# Patient Record
Sex: FEMALE | Race: WHITE | HISPANIC OR LATINO | Employment: OTHER | URBAN - METROPOLITAN AREA
[De-identification: names, ages, dates, MRNs, and addresses within clinical notes are randomized per-mention and may not be internally consistent; named-entity substitution may affect disease eponyms.]

---

## 2024-05-09 NOTE — PROGRESS NOTES
NEW PATIENT    HISTORY OF PRESENT ILLNESS   69 y.o. Female with a history of right ankle. She states that she has had bilateral achilles pain for many years. The right achilles is worse. She has had PRP injections in the right achilles with no relief.  She states she injured it about 2 months ago and was diagnosed with an Achilles injury in [place].  She presents for 2nd opinion.    She is also complaining of left knee pain for about a year. Her knee pain is worse with bending, climbing, and squatting. She reports having a laser therapy procedure on the left knee with no relief.  She states they offered her some type of procedure to clean up the fat pad.    Is affecting ADLs. Pain is 2/10 at it's worst.        PAST MEDICAL HISTORY    Past Medical History:   Diagnosis Date    Thyroid disease        PAST SURGICAL HISTORY     No past surgical history on file.    FAMILY HISTORY    No family history on file.    SOCIAL HISTORY    Social History     Socioeconomic History    Marital status:    Tobacco Use    Smoking status: Never   Substance and Sexual Activity    Alcohol use: Yes     Alcohol/week: 0.0 standard drinks of alcohol     Comment: Socially.   Social History Narrative    ** Merged History Encounter **            MEDICATIONS      Current Outpatient Medications:     levothyroxine (SYNTHROID) 75 MCG tablet, Take 1 tablet (75 mcg total) by mouth before breakfast., Disp: 90 tablet, Rfl: 3    pregabalin (LYRICA) 75 MG capsule, Take 75 mg by mouth once daily., Disp: , Rfl:     betamethasone valerate 0.1% (VALISONE) 0.1 % Crea, Apply topically 2 (two) times daily., Disp: 45 g, Rfl: 1    conjugated estrogens (PREMARIN) vaginal cream, Place 0.5 g vaginally once daily., Disp: 1 applicator, Rfl: 5    esomeprazole (NEXIUM) 40 MG capsule, Take 1 capsule (40 mg total) by mouth before breakfast., Disp: 90 capsule, Rfl: 0    ALLERGIES     Review of patient's allergies indicates:  No Known Allergies      REVIEW OF SYSTEMS  "  Constitution: Negative. Negative for chills, fever and night sweats.   HENT: Negative for congestion and headaches.    Eyes: Negative for blurred vision, left vision loss and right vision loss.   Cardiovascular: Negative for chest pain and syncope.   Respiratory: Negative for cough and shortness of breath.    Endocrine: Negative for polydipsia, polyphagia and polyuria.   Hematologic/Lymphatic: Negative for bleeding problem. Does not bruise/bleed easily.   Skin: Negative for dry skin, itching and rash.   Musculoskeletal: Negative for falls. Positive for right ankle and left knee pain.  Gastrointestinal: Negative for abdominal pain and bowel incontinence.   Genitourinary: Negative for bladder incontinence and nocturia.   Neurological: Negative for disturbances in coordination, loss of balance and seizures.   Psychiatric/Behavioral: Negative for depression. The patient does not have insomnia.    Allergic/Immunologic: Negative for hives and persistent infections.     PHYSICAL EXAMINATION    Vitals: /74   Pulse 65   Ht 5' 3" (1.6 m)   Wt 75.8 kg (167 lb)   BMI 29.58 kg/m²     General: The patient appears active and healthy with no apparent physical problems.  No disturbance of mood or affect is demonstrated. Alert and Oriented.    HEENT: Eyes normal, pupils equally round, nose normal.    Resp: Equal and symmetrical chest rises. No wheezing    CV: Regular rate    Neck: Supple; nonpainful range of motion.    Extremities: no cyanosis, clubbing, edema, or diffuse swelling.  Palpable pulses, good capillary refill of the digits.  No coolness, discoloration, edema or obvious varicosities.    Skin: no lesions noted.    Lymphatic: no detected adenopathy in the upper or lower extremities.    Neurologic: normal mental status, normal reflexes, normal gait and balance.  Patient is alert and oriented to person, place and time.  No flaccidity or spasticity is noted.  No motor or sensory deficits are noted.  Light touch is " intact    Orthopaedic: Ankle Exam-RIGHT    Inspection: Normal skin color and appearance with no scars, no ecchymosis and no swelling.  Longitudinal arch is normal.     ROM:  Limited range of motion of the right ankle.  Pain with dorsiflexion.  Mild pain with plantar flexion.      Palpation: There is no tenderness over the navicular, deltoid ligament, medial malleolus, anterior ankle joint, tibiofibular syndesmosis, lateral malleolus, ATFL, CFL, posterior talofibular ligament, sinus tarsi, calcaneus, plantar fascia origin, cuboid, fifth metatarsal, posterior tibialis tendon or peroneal tendons.  + Grady test, Tenderness along achilles tendon    Stability: - Anterior drawer - Talar tilt  Varus and valgus stress reveals no gapping or instability.     Strength: Muscle testing is 5/5 dorsiflexion, 4/5 plantar flexion, 5/5 inversion and 5/5 eversion.      Neuro: Sensation is normal in L4, L5 and S1 nerve distribution.  Reflexes are 2/2 ankle and 2/2 knee jerk.     Vascular: 2+ Pedal pulses are palpable.  Negative Homans.    KNEE EXAM - LEFT    Inspection:   Normal skin color and appearance with no scars, no ecchymosis and no effusion.      ROM:   0° - 110°.      Palpation:   There is no tenderness along medial joint line, medial plica, medial collateral ligament, pes bursa, lateral joint line, iliotibial band, lateral collateral ligament, popliteal fossa, patellar tendon, or quadriceps tendon.    Stability: - anterior drawer, - Lachman, - pivot shift and - posterior drawer.      No instability with varus or valgus stress at 0° or 30°. Negative dial  test at 30° and 90°.    Tests:   - Nigels test.  - patellar compression - grind test. There is no patellar apprehension. + patellofemoral crepitus     - J Sign. - Agustina's. - patellar tilt. - Quentin. Lateral patella translation  2 quadrants. Medial patella translation 2 quadrants    Motor:   Quadriceps strength is 5/5 and hamstrings strength is 5/5. Hamstrings show no  tightness.      Neuro:   Distal neurovascular status is normal    Vascular: Negative Homans and no palpable popliteal cords. 2+ pedal pulse with brisk cap refill    Gait slightly antalgic      IMAGING    X-Ray Ankle Complete Right  Narrative: EXAMINATION:  XR ANKLE COMPLETE 3 VIEW RIGHT    CLINICAL HISTORY:  Pain in right ankle and joints of right foot    TECHNIQUE:  AP, lateral, and oblique images of the right ankle were performed.    COMPARISON:  None    FINDINGS:  There is no evidence of fracture or osseous destruction.  There are surgical changes at the great toe incompletely imaged.  There is a 12 mm lobulated ossific density within the soft tissues posterior to the ankle.  Impression: As above    Electronically signed by: Yanci Sanchez MD  Date:    05/13/2024  Time:    10:58  X-ray Knee Ortho Left with Flexion  Narrative: EXAMINATION:  XR KNEE ORTHO LEFT WITH FLEXION    CLINICAL HISTORY:  . Pain in left knee    TECHNIQUE:  AP standing view of both knees, PA flexion standing views of both knees, and Merchant views of both knees were performed. A lateral view of the left knee was also performed.    COMPARISON:  None    FINDINGS:  The right knee demonstrates no fracture or osseous destructive process.  There is tricompartmental osteophyte formation.  Narrowing of the patellofemoral joint and to a lesser extent medial and lateral compartments of the knee is noted.    The left knee demonstrates no fracture or osseous destructive process.  There is tricompartmental osteophyte formation.  Narrowing of the medial and lateral compartments of the knee and to a lesser extent the patellofemoral joint.  No significant joint effusion.  Impression: As above    Electronically signed by: Yanci Sanchez MD  Date:    05/13/2024  Time:    10:53        IMPRESSION       ICD-10-CM ICD-9-CM   1. Achilles tendon rupture, right, initial encounter  S86.011A 845.09   2. Osteoarthritis of left patellofemoral joint  M17.12 715.36   3.  Right ankle pain, unspecified chronicity  M25.571 719.47   4. Chronic pain of left knee  M25.562 719.46    G89.29 338.29       MEDICATIONS PRESCRIBED      None    RECOMMENDATIONS     CSI of left knee performed today  MRI of right ankle ordered  RTC after MRI     Large Joint Aspiration/Injection: L knee    Date/Time: 5/13/2024 9:30 AM    Performed by: Tam Kan MD  Authorized by: Tam Kan MD    Consent Done?:  Yes (Verbal)  Indications:  Pain  Site marked: the procedure site was marked    Timeout: prior to procedure the correct patient, procedure, and site was verified    Prep: patient was prepped and draped in usual sterile fashion      Local anesthesia used?: Yes    Local anesthetic:  Co-phenylcaine spray (0.2% Naropin)  Anesthetic total (ml):  4      Details:  Needle Size:  22 G  Ultrasonic Guidance for needle placement?: No    Approach:  Anterolateral  Location:  Knee  Site:  L knee  Medications:  40 mg triamcinolone acetonide 40 mg/mL  Medications comment:  5 mL LIDOcaine HCL 10 mg/ml (1%) 10 mg/mL (1 %)  Patient tolerance:  Patient tolerated the procedure well with no immediate complications    All questions were answered, pt will contact us for questions or concerns in the interim.

## 2024-05-13 ENCOUNTER — HOSPITAL ENCOUNTER (OUTPATIENT)
Dept: RADIOLOGY | Facility: HOSPITAL | Age: 69
Discharge: HOME | End: 2024-05-13
Attending: ORTHOPAEDIC SURGERY

## 2024-05-13 ENCOUNTER — OFFICE VISIT (OUTPATIENT)
Dept: SPORTS MEDICINE | Facility: CLINIC | Age: 69
End: 2024-05-13

## 2024-05-13 VITALS
HEIGHT: 63 IN | SYSTOLIC BLOOD PRESSURE: 135 MMHG | DIASTOLIC BLOOD PRESSURE: 74 MMHG | BODY MASS INDEX: 29.59 KG/M2 | HEART RATE: 65 BPM | WEIGHT: 167 LBS

## 2024-05-13 DIAGNOSIS — S86.011A ACHILLES TENDON RUPTURE, RIGHT, INITIAL ENCOUNTER: Primary | ICD-10-CM

## 2024-05-13 DIAGNOSIS — M25.571 RIGHT ANKLE PAIN, UNSPECIFIED CHRONICITY: ICD-10-CM

## 2024-05-13 DIAGNOSIS — M25.562 CHRONIC PAIN OF LEFT KNEE: ICD-10-CM

## 2024-05-13 DIAGNOSIS — M17.12 OSTEOARTHRITIS OF LEFT PATELLOFEMORAL JOINT: ICD-10-CM

## 2024-05-13 DIAGNOSIS — G89.29 CHRONIC PAIN OF LEFT KNEE: ICD-10-CM

## 2024-05-13 PROCEDURE — 99213 OFFICE O/P EST LOW 20 MIN: CPT | Mod: PBBFAC,25 | Performed by: ORTHOPAEDIC SURGERY

## 2024-05-13 PROCEDURE — 73610 X-RAY EXAM OF ANKLE: CPT | Mod: 26,RT,, | Performed by: INTERNAL MEDICINE

## 2024-05-13 PROCEDURE — 20610 DRAIN/INJ JOINT/BURSA W/O US: CPT | Mod: PBBFAC,LT | Performed by: ORTHOPAEDIC SURGERY

## 2024-05-13 PROCEDURE — 99205 OFFICE O/P NEW HI 60 MIN: CPT | Mod: 25,S$PBB,, | Performed by: ORTHOPAEDIC SURGERY

## 2024-05-13 PROCEDURE — 73564 X-RAY EXAM KNEE 4 OR MORE: CPT | Mod: 26,LT,, | Performed by: INTERNAL MEDICINE

## 2024-05-13 PROCEDURE — 73562 X-RAY EXAM OF KNEE 3: CPT | Mod: 26,59,RT, | Performed by: INTERNAL MEDICINE

## 2024-05-13 PROCEDURE — 99999 PR PBB SHADOW E&M-EST. PATIENT-LVL III: CPT | Mod: PBBFAC,,, | Performed by: ORTHOPAEDIC SURGERY

## 2024-05-13 PROCEDURE — 99999PBSHW PR PBB SHADOW TECHNICAL ONLY FILED TO HB: Mod: PBBFAC,,,

## 2024-05-13 PROCEDURE — 73564 X-RAY EXAM KNEE 4 OR MORE: CPT | Mod: TC,LT

## 2024-05-13 PROCEDURE — 73610 X-RAY EXAM OF ANKLE: CPT | Mod: TC,RT

## 2024-05-13 RX ORDER — TRIAMCINOLONE ACETONIDE 40 MG/ML
40 INJECTION, SUSPENSION INTRA-ARTICULAR; INTRAMUSCULAR
Status: DISCONTINUED | OUTPATIENT
Start: 2024-05-13 | End: 2024-05-13 | Stop reason: HOSPADM

## 2024-05-13 RX ADMIN — TRIAMCINOLONE ACETONIDE 40 MG: 40 INJECTION, SUSPENSION INTRA-ARTICULAR; INTRAMUSCULAR at 09:05

## 2024-05-14 DIAGNOSIS — M51.36 DDD (DEGENERATIVE DISC DISEASE), LUMBAR: Primary | ICD-10-CM

## 2024-05-14 DIAGNOSIS — E03.9 ACQUIRED HYPOTHYROIDISM: Primary | ICD-10-CM

## 2024-05-14 NOTE — PROGRESS NOTES
"ESTABLISHED PATIENT     History 5/15/2024:  Bere returns to clinic today to discuss MRI results of her right ankle.    History 5/13/2024:  69 y.o. Female with a history of right ankle. She states that she has had bilateral achilles pain for many years. The right achilles is worse. She has had PRP injections in the right achilles with no relief.  She states she injured it about 2 months ago and was diagnosed with an Achilles injury in [place].  She presents for 2nd opinion.    She is also complaining of left knee pain for about a year. Her knee pain is worse with bending, climbing, and squatting. She reports having a laser therapy procedure on the left knee with no relief.  She states they offered her some type of procedure to clean up the fat pad.    Is affecting ADLs. Pain is 2/10 at it's worst.      REVIEW OF SYSTEMS   Constitution: Negative. Negative for chills, fever and night sweats.   HENT: Negative for congestion and headaches.    Eyes: Negative for blurred vision, left vision loss and right vision loss.   Cardiovascular: Negative for chest pain and syncope.   Respiratory: Negative for cough and shortness of breath.    Endocrine: Negative for polydipsia, polyphagia and polyuria.   Hematologic/Lymphatic: Negative for bleeding problem. Does not bruise/bleed easily.   Skin: Negative for dry skin, itching and rash.   Musculoskeletal: Negative for falls. Positive for right ankle and left knee pain.  Gastrointestinal: Negative for abdominal pain and bowel incontinence.   Genitourinary: Negative for bladder incontinence and nocturia.   Neurological: Negative for disturbances in coordination, loss of balance and seizures.   Psychiatric/Behavioral: Negative for depression. The patient does not have insomnia.    Allergic/Immunologic: Negative for hives and persistent infections.     PHYSICAL EXAMINATION    Vitals: /74   Pulse 65   Ht 5' 3" (1.6 m)   Wt 75.8 kg (167 lb)   BMI 29.58 kg/m²     General: The " patient appears active and healthy with no apparent physical problems.  No disturbance of mood or affect is demonstrated. Alert and Oriented.    HEENT: Eyes normal, pupils equally round, nose normal.    Resp: Equal and symmetrical chest rises. No wheezing    CV: Regular rate    Neck: Supple; nonpainful range of motion.    Extremities: no cyanosis, clubbing, edema, or diffuse swelling.  Palpable pulses, good capillary refill of the digits.  No coolness, discoloration, edema or obvious varicosities.    Skin: no lesions noted.    Lymphatic: no detected adenopathy in the upper or lower extremities.    Neurologic: normal mental status, normal reflexes, normal gait and balance.  Patient is alert and oriented to person, place and time.  No flaccidity or spasticity is noted.  No motor or sensory deficits are noted.  Light touch is intact    Orthopaedic: Ankle Exam-RIGHT    Inspection: Normal skin color and appearance with no scars, no ecchymosis and no swelling.  Longitudinal arch is normal.     ROM:  Limited range of motion of the right ankle.  Pain with dorsiflexion.  Mild pain with plantar flexion.      Palpation: There is no tenderness over the navicular, deltoid ligament, medial malleolus, anterior ankle joint, tibiofibular syndesmosis, lateral malleolus, ATFL, CFL, posterior talofibular ligament, sinus tarsi, calcaneus, plantar fascia origin, cuboid, fifth metatarsal, posterior tibialis tendon or peroneal tendons.  + Grady test, Tenderness along achilles tendon    Stability: - Anterior drawer - Talar tilt  Varus and valgus stress reveals no gapping or instability.     Strength: Muscle testing is 5/5 dorsiflexion, 4/5 plantar flexion, 5/5 inversion and 5/5 eversion.      Neuro: Sensation is normal in L4, L5 and S1 nerve distribution.  Reflexes are 2/2 ankle and 2/2 knee jerk.     Vascular: 2+ Pedal pulses are palpable.  Negative Homans.    IMAGING    X-Ray Ankle Complete Right  Narrative: EXAMINATION:  XR ANKLE  COMPLETE 3 VIEW RIGHT    CLINICAL HISTORY:  Pain in right ankle and joints of right foot    TECHNIQUE:  AP, lateral, and oblique images of the right ankle were performed.    COMPARISON:  None    FINDINGS:  There is no evidence of fracture or osseous destruction.  There are surgical changes at the great toe incompletely imaged.  There is a 12 mm lobulated ossific density within the soft tissues posterior to the ankle.  Impression: As above    Electronically signed by: Yanci Sanchez MD  Date:    05/13/2024  Time:    10:58    Narrative & Impression  EXAMINATION:  MRI ANKLE WITHOUT CONTRAST RIGHT     CLINICAL HISTORY:  Ankle trauma, dislocation/ligament injury suspected (Age >= 5y);  Strain of right Achilles tendon, initial encounter     TECHNIQUE:  Multiplanar multisequence MRI examination of the right ankle.     COMPARISON:  Renal radiograph 05/13/2024.     FINDINGS:  LIGAMENTS: Anterior talofibular, posterior talofibular, superficial deltoid, deep deltoid, superomedial spring, anterior-inferior tibiofibular, posterior-inferior tibiofibular, dorsal talonavicular, bifurcate and dorsal calcaneocuboid ligaments appear grossly intact.     TENDONS: Complete avulsion of the insertional fibers of the Achilles tendon with 4.5 cm of retraction.  Retracted tendon stump demonstrates tendinosis with partial-thickness interstitial tearing and a 1.5 cm avulsed enthesophyte.  Peroneus brevis tendinosis with partial-thickness tearing most pronounced just distal to the retromalleolar groove.  Peroneus longus tendinosis with partial-thickness tearing extending from the retromalleolar groove distally up to the level of the cuboid sulcus.  Trace posterior tibial tendon sheath fluid.  Flexor digitorum longus, flexor hallucis longus and extensor tendons are intact.     BONES: No acute fractures.  No avascular necrosis.  No marrow infiltrative process.  Mild subcortical marrow edema at the dorsal aspect of the talar neck, likely  degenerative.  Slightly prominent peroneal tubercle.     JOINTS/CARTILAGE: 0.3 x 0.4 cm focal area of chondral fissuring with subchondral cystic change at the medial talar dome.  Subtalar, midtarsal, naviculocuneiform and tarsometatarsal chondral surfaces are grossly preserved.  No significant joint effusion.  No tarsal coalition.  No subluxation or dislocation.     MUSCLES: Normal bulk and signal intensity.  No accessory muscles.     MISCELLANEOUS: Thickening and increased signal intensity of the central cord of the plantar aponeurosis with a small calcaneal enthesophyte.  Sinus tarsi demonstrates preserved signal intensity.  Tarsal tunnel appears normal.  Mild nonspecific subcutaneous edema.     Impression:     1. Complete avulsion of the insertional Achilles tendon with 4.5 cm of retraction.  Tendinosis and partial-thickness interstitial tearing of the retracted tendon stump with a 1.5 cm avulsed enthesophyte.  2. Peroneus longus and peroneus brevis tendinosis and partial-thickness tearing.  3. 0.3 x 0.4 cm osteochondral lesion of the medial talar dome.  4. Findings suggestive of mild chronic plantar fascitis involving the central cord.     Electronically signed by resident: Beena Ochoa  Date:                                            05/15/2024  Time:                                           11:36     Electronically signed by:González Mascorro MD  Date:                                            05/15/2024  Time:                                           15:09    IMPRESSION       ICD-10-CM ICD-9-CM   1. Achilles tendon rupture, right, initial encounter  S86.011A 845.09         MEDICATIONS PRESCRIBED      None    RECOMMENDATIONS     Referral to Coy Clement MD for second opinion.  We discussed nonsurgical and surgical management of her chronic insertional Achilles tendon rupture.  Considering it has been nearly 6 months in addition to her low demand lifestyle, I did recommend potential nonoperative management  as an option.  She does have some concern related to some hammertoes that she wants to get an opinion on.  I recommended she obtain a 2nd opinion for both her Achilles and the hammertoes.  I advised her that the Achilles could require not only a V-Y lengthening but may require an [facility] transfer.  I will see her back as needed.  I will also be happy to do the surgery in combination with Dr. Johnson if needed.      All questions were answered, pt will contact us for questions or concerns in the interim.

## 2024-05-15 ENCOUNTER — OFFICE VISIT (OUTPATIENT)
Dept: SPORTS MEDICINE | Facility: CLINIC | Age: 69
End: 2024-05-15

## 2024-05-15 ENCOUNTER — HOSPITAL ENCOUNTER (OUTPATIENT)
Dept: RADIOLOGY | Facility: HOSPITAL | Age: 69
Discharge: HOME | End: 2024-05-15
Attending: STUDENT IN AN ORGANIZED HEALTH CARE EDUCATION/TRAINING PROGRAM

## 2024-05-15 ENCOUNTER — OFFICE VISIT (OUTPATIENT)
Dept: INTERNAL MEDICINE | Facility: CLINIC | Age: 69
End: 2024-05-15

## 2024-05-15 ENCOUNTER — HOSPITAL ENCOUNTER (OUTPATIENT)
Dept: CARDIOLOGY | Facility: CLINIC | Age: 69
Discharge: HOME | End: 2024-05-15

## 2024-05-15 ENCOUNTER — HOSPITAL ENCOUNTER (OUTPATIENT)
Dept: RADIOLOGY | Facility: HOSPITAL | Age: 69
Discharge: HOME | End: 2024-05-15
Attending: ORTHOPAEDIC SURGERY

## 2024-05-15 VITALS
WEIGHT: 167 LBS | BODY MASS INDEX: 29.59 KG/M2 | SYSTOLIC BLOOD PRESSURE: 126 MMHG | HEIGHT: 63 IN | HEART RATE: 65 BPM | DIASTOLIC BLOOD PRESSURE: 70 MMHG

## 2024-05-15 VITALS
BODY MASS INDEX: 29.54 KG/M2 | DIASTOLIC BLOOD PRESSURE: 69 MMHG | WEIGHT: 166.69 LBS | HEIGHT: 63 IN | HEART RATE: 65 BPM | SYSTOLIC BLOOD PRESSURE: 137 MMHG

## 2024-05-15 DIAGNOSIS — E03.9 ACQUIRED HYPOTHYROIDISM: ICD-10-CM

## 2024-05-15 DIAGNOSIS — S86.011A ACHILLES TENDON RUPTURE, RIGHT, INITIAL ENCOUNTER: Primary | ICD-10-CM

## 2024-05-15 DIAGNOSIS — I87.2 VENOUS INSUFFICIENCY: ICD-10-CM

## 2024-05-15 DIAGNOSIS — S86.009D: ICD-10-CM

## 2024-05-15 DIAGNOSIS — R32 URINARY INCONTINENCE, UNSPECIFIED TYPE: ICD-10-CM

## 2024-05-15 DIAGNOSIS — S86.001D INJURY OF RIGHT ACHILLES TENDON, SUBSEQUENT ENCOUNTER: ICD-10-CM

## 2024-05-15 DIAGNOSIS — S86.011A ACHILLES TENDON RUPTURE, RIGHT, INITIAL ENCOUNTER: ICD-10-CM

## 2024-05-15 DIAGNOSIS — Z00.00 HEALTHCARE MAINTENANCE: ICD-10-CM

## 2024-05-15 DIAGNOSIS — M51.36 DDD (DEGENERATIVE DISC DISEASE), LUMBAR: ICD-10-CM

## 2024-05-15 DIAGNOSIS — E03.9 ACQUIRED HYPOTHYROIDISM: Primary | ICD-10-CM

## 2024-05-15 DIAGNOSIS — Z00.00 ROUTINE GENERAL MEDICAL EXAMINATION AT HEALTH CARE FACILITY: Primary | ICD-10-CM

## 2024-05-15 DIAGNOSIS — D72.823 LEUKEMOID REACTION: ICD-10-CM

## 2024-05-15 LAB
OHS QRS DURATION: 88 MS
OHS QTC CALCULATION: 409 MS

## 2024-05-15 PROCEDURE — 99214 OFFICE O/P EST MOD 30 MIN: CPT | Mod: S$PBB,,, | Performed by: ORTHOPAEDIC SURGERY

## 2024-05-15 PROCEDURE — 71046 X-RAY EXAM CHEST 2 VIEWS: CPT | Mod: TC

## 2024-05-15 PROCEDURE — 99213 OFFICE O/P EST LOW 20 MIN: CPT | Mod: PBBFAC,25 | Performed by: ORTHOPAEDIC SURGERY

## 2024-05-15 PROCEDURE — 93005 ELECTROCARDIOGRAM TRACING: CPT | Mod: PBBFAC | Performed by: INTERNAL MEDICINE

## 2024-05-15 PROCEDURE — 99999 PR PBB SHADOW E&M-EST. PATIENT-LVL V: CPT | Mod: PBBFAC,,, | Performed by: STUDENT IN AN ORGANIZED HEALTH CARE EDUCATION/TRAINING PROGRAM

## 2024-05-15 PROCEDURE — 99215 OFFICE O/P EST HI 40 MIN: CPT | Mod: PBBFAC,25,27 | Performed by: STUDENT IN AN ORGANIZED HEALTH CARE EDUCATION/TRAINING PROGRAM

## 2024-05-15 PROCEDURE — 93010 ELECTROCARDIOGRAM REPORT: CPT | Mod: S$PBB,,, | Performed by: INTERNAL MEDICINE

## 2024-05-15 PROCEDURE — 73721 MRI JNT OF LWR EXTRE W/O DYE: CPT | Mod: 26,RT,, | Performed by: RADIOLOGY

## 2024-05-15 PROCEDURE — 73721 MRI JNT OF LWR EXTRE W/O DYE: CPT | Mod: TC,RT

## 2024-05-15 PROCEDURE — 99204 OFFICE O/P NEW MOD 45 MIN: CPT | Mod: S$PBB,,, | Performed by: STUDENT IN AN ORGANIZED HEALTH CARE EDUCATION/TRAINING PROGRAM

## 2024-05-15 PROCEDURE — 99999 PR PBB SHADOW E&M-EST. PATIENT-LVL III: CPT | Mod: PBBFAC,,, | Performed by: ORTHOPAEDIC SURGERY

## 2024-05-15 PROCEDURE — 71046 X-RAY EXAM CHEST 2 VIEWS: CPT | Mod: 26,,, | Performed by: RADIOLOGY

## 2024-05-15 RX ORDER — LEVOTHYROXINE SODIUM 75 UG/1
75 TABLET ORAL
COMMUNITY
End: 2024-05-15

## 2024-05-15 RX ORDER — ESOMEPRAZOLE MAGNESIUM 40 MG/1
40 CAPSULE, DELAYED RELEASE ORAL
COMMUNITY

## 2024-05-15 RX ORDER — LEVOTHYROXINE SODIUM 50 UG/1
50 TABLET ORAL
Qty: 90 TABLET | Refills: 3 | Status: SHIPPED | OUTPATIENT
Start: 2024-05-15 | End: 2025-05-15

## 2024-05-15 NOTE — PROGRESS NOTES
"ESTABLISHED PATIENT     History 5/15/2024:  Bere returns to clinic today to discuss MRI results of her right ankle.    History 5/13/2024:  69 y.o. Female with a history of right ankle. She states that she has had bilateral achilles pain for many years. The right achilles is worse. She has had PRP injections in the right achilles with no relief.  She states she injured it about 2 months ago and was diagnosed with an Achilles injury in [place].  She presents for 2nd opinion.    She is also complaining of left knee pain for about a year. Her knee pain is worse with bending, climbing, and squatting. She reports having a laser therapy procedure on the left knee with no relief.  She states they offered her some type of procedure to clean up the fat pad.    Is affecting ADLs. Pain is 2/10 at it's worst.      REVIEW OF SYSTEMS   Constitution: Negative. Negative for chills, fever and night sweats.   HENT: Negative for congestion and headaches.    Eyes: Negative for blurred vision, left vision loss and right vision loss.   Cardiovascular: Negative for chest pain and syncope.   Respiratory: Negative for cough and shortness of breath.    Endocrine: Negative for polydipsia, polyphagia and polyuria.   Hematologic/Lymphatic: Negative for bleeding problem. Does not bruise/bleed easily.   Skin: Negative for dry skin, itching and rash.   Musculoskeletal: Negative for falls. Positive for right ankle and left knee pain.  Gastrointestinal: Negative for abdominal pain and bowel incontinence.   Genitourinary: Negative for bladder incontinence and nocturia.   Neurological: Negative for disturbances in coordination, loss of balance and seizures.   Psychiatric/Behavioral: Negative for depression. The patient does not have insomnia.    Allergic/Immunologic: Negative for hives and persistent infections.     PHYSICAL EXAMINATION    Vitals: /74   Pulse 65   Ht 5' 3" (1.6 m)   Wt 75.8 kg (167 lb)   BMI 29.58 kg/m²     General: The " patient appears active and healthy with no apparent physical problems.  No disturbance of mood or affect is demonstrated. Alert and Oriented.    HEENT: Eyes normal, pupils equally round, nose normal.    Resp: Equal and symmetrical chest rises. No wheezing    CV: Regular rate    Neck: Supple; nonpainful range of motion.    Extremities: no cyanosis, clubbing, edema, or diffuse swelling.  Palpable pulses, good capillary refill of the digits.  No coolness, discoloration, edema or obvious varicosities.    Skin: no lesions noted.    Lymphatic: no detected adenopathy in the upper or lower extremities.    Neurologic: normal mental status, normal reflexes, normal gait and balance.  Patient is alert and oriented to person, place and time.  No flaccidity or spasticity is noted.  No motor or sensory deficits are noted.  Light touch is intact    Orthopaedic: Ankle Exam-RIGHT    Inspection: Normal skin color and appearance with no scars, no ecchymosis and no swelling.  Longitudinal arch is normal.     ROM:  Limited range of motion of the right ankle.  Pain with dorsiflexion.  Mild pain with plantar flexion.      Palpation: There is no tenderness over the navicular, deltoid ligament, medial malleolus, anterior ankle joint, tibiofibular syndesmosis, lateral malleolus, ATFL, CFL, posterior talofibular ligament, sinus tarsi, calcaneus, plantar fascia origin, cuboid, fifth metatarsal, posterior tibialis tendon or peroneal tendons.  + Grady test, Tenderness along achilles tendon    Stability: - Anterior drawer - Talar tilt  Varus and valgus stress reveals no gapping or instability.     Strength: Muscle testing is 5/5 dorsiflexion, 4/5 plantar flexion, 5/5 inversion and 5/5 eversion.      Neuro: Sensation is normal in L4, L5 and S1 nerve distribution.  Reflexes are 2/2 ankle and 2/2 knee jerk.     Vascular: 2+ Pedal pulses are palpable.  Negative Homans.    IMAGING    X-Ray Ankle Complete Right  Narrative: EXAMINATION:  XR ANKLE  COMPLETE 3 VIEW RIGHT    CLINICAL HISTORY:  Pain in right ankle and joints of right foot    TECHNIQUE:  AP, lateral, and oblique images of the right ankle were performed.    COMPARISON:  None    FINDINGS:  There is no evidence of fracture or osseous destruction.  There are surgical changes at the great toe incompletely imaged.  There is a 12 mm lobulated ossific density within the soft tissues posterior to the ankle.  Impression: As above    Electronically signed by: Yanci Sanchez MD  Date:    05/13/2024  Time:    10:58    Narrative & Impression  EXAMINATION:  MRI ANKLE WITHOUT CONTRAST RIGHT     CLINICAL HISTORY:  Ankle trauma, dislocation/ligament injury suspected (Age >= 5y);  Strain of right Achilles tendon, initial encounter     TECHNIQUE:  Multiplanar multisequence MRI examination of the right ankle.     COMPARISON:  Renal radiograph 05/13/2024.     FINDINGS:  LIGAMENTS: Anterior talofibular, posterior talofibular, superficial deltoid, deep deltoid, superomedial spring, anterior-inferior tibiofibular, posterior-inferior tibiofibular, dorsal talonavicular, bifurcate and dorsal calcaneocuboid ligaments appear grossly intact.     TENDONS: Complete avulsion of the insertional fibers of the Achilles tendon with 4.5 cm of retraction.  Retracted tendon stump demonstrates tendinosis with partial-thickness interstitial tearing and a 1.5 cm avulsed enthesophyte.  Peroneus brevis tendinosis with partial-thickness tearing most pronounced just distal to the retromalleolar groove.  Peroneus longus tendinosis with partial-thickness tearing extending from the retromalleolar groove distally up to the level of the cuboid sulcus.  Trace posterior tibial tendon sheath fluid.  Flexor digitorum longus, flexor hallucis longus and extensor tendons are intact.     BONES: No acute fractures.  No avascular necrosis.  No marrow infiltrative process.  Mild subcortical marrow edema at the dorsal aspect of the talar neck, likely  degenerative.  Slightly prominent peroneal tubercle.     JOINTS/CARTILAGE: 0.3 x 0.4 cm focal area of chondral fissuring with subchondral cystic change at the medial talar dome.  Subtalar, midtarsal, naviculocuneiform and tarsometatarsal chondral surfaces are grossly preserved.  No significant joint effusion.  No tarsal coalition.  No subluxation or dislocation.     MUSCLES: Normal bulk and signal intensity.  No accessory muscles.     MISCELLANEOUS: Thickening and increased signal intensity of the central cord of the plantar aponeurosis with a small calcaneal enthesophyte.  Sinus tarsi demonstrates preserved signal intensity.  Tarsal tunnel appears normal.  Mild nonspecific subcutaneous edema.     Impression:     1. Complete avulsion of the insertional Achilles tendon with 4.5 cm of retraction.  Tendinosis and partial-thickness interstitial tearing of the retracted tendon stump with a 1.5 cm avulsed enthesophyte.  2. Peroneus longus and peroneus brevis tendinosis and partial-thickness tearing.  3. 0.3 x 0.4 cm osteochondral lesion of the medial talar dome.  4. Findings suggestive of mild chronic plantar fascitis involving the central cord.     Electronically signed by resident: Beena Ochoa  Date:                                            05/15/2024  Time:                                           11:36     Electronically signed by:González Mascorro MD  Date:                                            05/15/2024  Time:                                           15:09    IMPRESSION       ICD-10-CM ICD-9-CM   1. Achilles tendon rupture, right, initial encounter  S86.011A 845.09         MEDICATIONS PRESCRIBED      None    RECOMMENDATIONS     Referral to Coy Clement MD for second opinion.  We discussed nonsurgical and surgical management of her chronic insertional Achilles tendon rupture.  Considering it has been nearly 6 months in addition to her low demand lifestyle, I did recommend potential nonoperative management  as an option.  She does have some concern related to some hammertoes that she wants to get an opinion on.  I recommended she obtain a 2nd opinion for both her Achilles and the hammertoes.  I advised her that the Achilles could require not only a V-Y lengthening but may require an [facility] transfer.  I will see her back as needed.  I will also be happy to do the surgery in combination with Dr. Johnson if needed.      All questions were answered, pt will contact us for questions or concerns in the interim.

## 2024-05-15 NOTE — ASSESSMENT & PLAN NOTE
Long history of lumbar back pain radiating to the left gluteal area (burning).  Not incapacitating, mild.    Observation for now

## 2024-05-15 NOTE — PROGRESS NOTES
Subjective:       Patient ID: Bere Rivera is a 69 y.o. female.    Chief Complaint: Annual Exam    HPI    Bere Rivera is a 69 y.o. female , Azeri speaking, with a history of:  Hypothyroidism  R Achilles tendon injury  OA left, patellofemoral Sx  R ankle pain  Chronic pain of left knee  Urinary incontinence  H/o cystocele      [International Patient]  Originally from [place]  Lives in: [facility] [id number]       Patient comes to the clinic a general medical health examination and to establish care.  This is the first time I see this patient.    Patient is currently asymptomatic and has no complaints.  She is interested in a preoperative evaluation given possible right ankle surgery.    She states she has a very healthy person and he only problems has been knee pain, right ankle pain and right foot pain.    She had left knee arthroscopy last year and she is was told that at some point she is going to need left and right knee replacements.      SheWas also being seen for right ankle, that apparently was not Achilles tendon lesion.  She has been seen by sports medicine for these reason.  She also reports that patient has been modifying the way she walks and this led to deformity of the toes of the right foot.  She had a bunion surgery several years ago.      She has been on thyroid medication (Synthroid 75 mcg) for several years.  She is currently asymptomatic, feels well, she does not have any changes in her temperature, hair loss, palpitations, constipation or other constitutional symptoms.    Patient denies weight loss, hematochezia, melena, recent respiratory infections, urinary tract infections.      Patient had a hysterectomy in 2003.  She was told she hadCystocele daily and at some point she was recommended repair with mesh.  She has not done the surgery yet.  She complains of urgency urinary incontinence and dripping.  She has managed with wearing diapers before  bedtime, because she says she can not make it to the bathroom on time and she treats and wets her pajamas.    Patient denies CV symptoms, CP, SOB, palpitations.  Patient denies constitutional symptoms, fever, changes in the urine or stool.  No other concerns.    Past Medical History:   Diagnosis Date    Achilles tendon injury     RIGHT    Cystocele, unspecified     Hallux valgus (acquired), right foot     Hypothyroidism (acquired) 1989    OA (osteoarthritis) of knee     Other seasonal allergic rhinitis     Unspecified urinary incontinence        Past Surgical History:   Procedure Laterality Date    ARTHROSCOPY OF KNEE Left 2023    BUNIONECTOMY Right 2017    HYSTERECTOMY  2003       No family history on file.    Allergies:   Review of patient's allergies indicates:  No Known Allergies      Current Outpatient Medications:     esomeprazole (NEXIUM) 40 MG capsule, Take 40 mg by mouth before breakfast. AS NEEDED - Every 3 months, Disp: , Rfl:     UNABLE TO FIND, medication name: XUMER -- PAIN & ANTI- INFLAMMATORY (Patient not taking: Reported on 5/15/2024), Disp: , Rfl:     SYNTHROID 50 mcg tablet, Take 1 tablet (50 mcg total) by mouth before breakfast., Disp: 90 tablet, Rfl: 3    Social history:  , lives in [place]    Exercise:   Sedentary    Diet:   Regular with no restrictions    Tobacco use: Denies    Tobacco Use: Unknown (5/15/2024)    Patient History     Smoking Tobacco Use: Never     Smokeless Tobacco Use: Unknown     Passive Exposure: Not on file        Alcohol use: Denies    Recreational drug use: Denies    Recent travel: [place]      Most recent laboratories reviewed:    Recent Labs   Lab 05/15/24  0801 05/15/24  1412   WBC 17.41 H 17.89 H   Hemoglobin 14.2 14.4   Hematocrit 41.9 42.3   MCV 91 92   Platelets 406 423       Recent Labs   Lab 05/15/24  0801   Glucose 83   Sodium 141   Potassium 3.6   BUN 12   Creatinine 0.8   Total Bilirubin 0.5   AST 15   ALT 15       Recent Labs   Lab 05/15/24  0801    Hemoglobin A1C 5.5       Recent Labs   Lab 05/15/24  0801   Cholesterol 202 H   Triglycerides 96   HDL 78 H   LDL Cholesterol 104.8   Non-HDL Cholesterol 124       Recent Labs   Lab 05/15/24  0801   TSH 0.296 L       Recent Labs   Lab 05/15/24  0801   Hepatitis C Ab Non-reactive       Most recent images reviewed if available:  MRI Ankle Without Contrast Right  Narrative: EXAMINATION:  MRI ANKLE WITHOUT CONTRAST RIGHT    CLINICAL HISTORY:  Ankle trauma, dislocation/ligament injury suspected (Age >= 5y);  Strain of right Achilles tendon, initial encounter    TECHNIQUE:  Multiplanar multisequence MRI examination of the right ankle.    COMPARISON:  Renal radiograph 05/13/2024.    FINDINGS:  LIGAMENTS: Anterior talofibular, posterior talofibular, superficial deltoid, deep deltoid, superomedial spring, anterior-inferior tibiofibular, posterior-inferior tibiofibular, dorsal talonavicular, bifurcate and dorsal calcaneocuboid ligaments appear grossly intact.    TENDONS: Complete avulsion of the insertional fibers of the Achilles tendon with 4.5 cm of retraction.  Retracted tendon stump demonstrates tendinosis with partial-thickness interstitial tearing and a 1.5 cm avulsed enthesophyte.  Peroneus brevis tendinosis with partial-thickness tearing most pronounced just distal to the retromalleolar groove.  Peroneus longus tendinosis with partial-thickness tearing extending from the retromalleolar groove distally up to the level of the cuboid sulcus.  Trace posterior tibial tendon sheath fluid.  Flexor digitorum longus, flexor hallucis longus and extensor tendons are intact.    BONES: No acute fractures.  No avascular necrosis.  No marrow infiltrative process.  Mild subcortical marrow edema at the dorsal aspect of the talar neck, likely degenerative.  Slightly prominent peroneal tubercle.    JOINTS/CARTILAGE: 0.3 x 0.4 cm focal area of chondral fissuring with subchondral cystic change at the medial talar dome.  Subtalar,  midtarsal, naviculocuneiform and tarsometatarsal chondral surfaces are grossly preserved.  No significant joint effusion.  No tarsal coalition.  No subluxation or dislocation.    MUSCLES: Normal bulk and signal intensity.  No accessory muscles.    MISCELLANEOUS: Thickening and increased signal intensity of the central cord of the plantar aponeurosis with a small calcaneal enthesophyte.  Sinus tarsi demonstrates preserved signal intensity.  Tarsal tunnel appears normal.  Mild nonspecific subcutaneous edema.  Impression: 1. Complete avulsion of the insertional Achilles tendon with 4.5 cm of retraction.  Tendinosis and partial-thickness interstitial tearing of the retracted tendon stump with a 1.5 cm avulsed enthesophyte.  2. Peroneus longus and peroneus brevis tendinosis and partial-thickness tearing.  3. 0.3 x 0.4 cm osteochondral lesion of the medial talar dome.  4. Findings suggestive of mild chronic plantar fascitis involving the central cord.    Electronically signed by resident: Beena Ochoa  Date:    05/15/2024  Time:    11:36    Electronically signed by: González Mascorro MD  Date:    05/15/2024  Time:    15:09  X-Ray Chest PA And Lateral  Narrative: EXAMINATION:  XR CHEST PA AND LATERAL    CLINICAL HISTORY:  Hypothyroidism, unspecified    TECHNIQUE:  PA and lateral views of the chest were performed.    COMPARISON:  09/09/2011    FINDINGS:  Cardiac size is normal.  Lungs are clear.  No infiltrate is seen.  Impression: Normal chest    Electronically signed by: Sumanth Clarke MD  Date:    05/15/2024  Time:    12:30        Latest ECG results:  Results for orders placed or performed during the hospital encounter of 05/15/24   SCHEDULED EKG 12-LEAD (to Muse)    Collection Time: 05/15/24 12:33 PM   Result Value Ref Range    QRS Duration 88 ms    [facility] QTC Calculation 409 ms    Narrative    Test Reason : E03.9,    Vent. Rate : 069 BPM     Atrial Rate : 069 BPM     P-R Int : 106 ms          QRS Dur : 088 ms      QT  "Int : 382 ms       P-R-T Axes : 055 029 051 degrees     QTc Int : 409 ms    Sinus rhythm with short OR  Otherwise normal ECG  When compared with ECG of 09-NOV-2009 10:06,  No significant change was found  Confirmed by Bar Mann MD ([id number] on 5/15/2024 1:40:32 PM    Referred By: TOÑA TURNER           Confirmed By:Bar Mann MD       Results for orders placed or performed during the hospital encounter of 05/15/24 (from the past 2160 hour(s))   MRI Ankle Without Contrast Right    Impression    1. Complete avulsion of the insertional Achilles tendon with 4.5 cm of retraction.  Tendinosis and partial-thickness interstitial tearing of the retracted tendon stump with a 1.5 cm avulsed enthesophyte.  2. Peroneus longus and peroneus brevis tendinosis and partial-thickness tearing.  3. 0.3 x 0.4 cm osteochondral lesion of the medial talar dome.  4. Findings suggestive of mild chronic plantar fascitis involving the central cord.    Electronically signed by resident: Beena Ochoa  Date:    05/15/2024  Time:    11:36    Electronically signed by: González Mascorro MD  Date:    05/15/2024  Time:    15:09         Healthcare Maintenance:  Colon cancer screening:       Vaccinations:        Tetanus - ?       Shingles - 2022       Influenza - 2022       Pneumonia - ?       COVID - completed X 3    Depression screening: PHQ2 score = 0    Annual visit approx. Month: MAY    ROS  11-point review of systems done. Negative except for detailed in the HPI.          Objective:      /69   Pulse 65   Ht 5' 3" (1.6 m)   Wt 75.6 kg (166 lb 10.7 oz)   BMI 29.52 kg/m²      Physical Exam   General appearance: Good general aspect, NAD, conversant, obese  Eyes and HEENT: Normal sclerae, moist mucous membranes, no thyromegaly or lymphadenopathy  Respiratory: No work of breathing, clear to auscultation bilaterally. No rales, rhonchi, wheezing, or rubs.  Cardiovascular: PMI not displaced. RRR. Normal S1, S2. No murmurs, rubs or " gallops.  Abdomen and : Soft, non-tender, nondistended, BS, no hepatosplenomegaly, no masses.  Extremities: no edemas, no extremity lymphadenopathy, no clubbing, no cyanosis. BLLE varicose veins and telangiectasias noted.  Tenderness to palpation of achilles tendon  Nervous System: Alert and oriented x 3, good concentration, no deficits.  Skin: Normal temperature, turgor and texture; no rash, ulcers or subcutaneous nodules  Psych: Appropriate affect, alert and oriented to person, place and time          Assessment:       1. Routine general medical examination at health care facility  Comments:  Patient is in overall good physical health, came for a physical exam preop, incidental finding of leukemoid reaction  Overview:  Patient is in overall good physical health, came for a physical exam preop, incidental finding of leukemoid reaction      2. Acquired hypothyroidism  Assessment & Plan:  Lab Results   Component Value Date    TSH 0.296 (L) 05/15/2024     On levothyroxine for more than 30 years.    Currently asymptomatic.    I will decrease the dose of Synthroid from 75 to 50 mcg daily.  Rx printed and given to the patient    Orders:  -     SYNTHROID 50 mcg tablet; Take 1 tablet (50 mcg total) by mouth before breakfast.  Dispense: 90 tablet; Refill: 3    3. Leukemoid reaction  Assessment & Plan:  Lab Results   Component Value Date    WBC 17.89 (H) 05/15/2024    HGB 14.4 05/15/2024    HCT 42.3 05/15/2024    MCV 92 05/15/2024     05/15/2024     Incidental finding  Repeat CBC shows the same elevated WBCs  Patient is asymptomatic, no source of infection found.  Normal UA and chest x-ray.    Flow cytometry ordered  Pathology interpretation ordered.    Referral to Hematology    Orders:  -     CBC W/ AUTO DIFFERENTIAL; Future; Expected date: 05/15/2024  -     Flow Cytometry Analysis (Peripheral Blood); Future; Expected date: 05/15/2024  -     Pathologist Interpretation Differential; Future; Expected date:  05/15/2024  -     Ambulatory referral/consult to Hematology / Oncology; Future; Expected date: 05/22/2024    4. Urinary incontinence, unspecified type  Assessment & Plan:  Consistent of urgency and dripping.    History of cystocele daily.  Not repaired.  Referral to Urogynecology        Orders:  -     Ambulatory referral/consult to Urogynecology; Future; Expected date: 05/22/2024    5. Injury of Achilles tendon, subsequent encounter  -     Ambulatory referral/consult to Podiatry; Future; Expected date: 05/22/2024    6. DDD (degenerative disc disease), lumbar  Assessment & Plan:  Long history of lumbar back pain radiating to the left gluteal area (burning).  Not incapacitating, mild.    Observation for now      7. Venous insufficiency  Assessment & Plan:  Bilateral lower extremity telangiectasias, abundant, about his who is veins.    No leg swelling or pain.    Recommended compression stockings for now      8. Injury of right Achilles tendon, subsequent encounter  Overview:  RIGHT    Assessment & Plan:  Results for orders placed or performed during the hospital encounter of 05/15/24 (from the past 2160 hour(s))   MRI Ankle Without Contrast Right    Impression    1. Complete avulsion of the insertional Achilles tendon with 4.5 cm of retraction.  Tendinosis and partial-thickness interstitial tearing of the retracted tendon stump with a 1.5 cm avulsed enthesophyte.  2. Peroneus longus and peroneus brevis tendinosis and partial-thickness tearing.  3. 0.3 x 0.4 cm osteochondral lesion of the medial talar dome.  4. Findings suggestive of mild chronic plantar fascitis involving the central cord.    Electronically signed by resident: Beena Ochoa  Date:    05/15/2024  Time:    11:36    Electronically signed by: González Mascorro MD  Date:    05/15/2024  Time:    15:09     Patient is being managed by sports Medicine.  Referral to Podiatry.  Possible surgery.      9. Healthcare maintenance  Assessment & Plan:  Health care  maintenance and core gaps reviewed and assessed with patient.  Vaccinations:        Tetanus - ?       Shingles - 2022       Influenza - 2022       Pneumonia - ?       COVID - completed X 3  We will obtain records from the [facility], apparently patient was vaccinated there  Colon cancer screening:   Colonoscopy: 2021?  Lifestyle recommendations given.  Physical activity recommended.    Legend: Ordered (O), Declined (D), Current (C)        Orders:  -     Mammo Digital Diagnostic Bilat; Future; Expected date: 05/15/2024       Plan:       Repeat CBC  Flow cytometry  Pathology interpretation  Referral to Hemonc  Referral to podiatry    I will assume as PCP.    Problem list updated  Medications reconciled  Education provided  Lifestyle recommendations given  AVS generated, explained, and sent to the patient.  RTC in : 1 week for review.            TOÑA TURNER MD, MPH  Internal Medicine  [facility]  [facility]

## 2024-05-15 NOTE — PROGRESS NOTES
ESTABLISHED PATIENT    History 5/15/2024:  Rupal returns here today to complete her preoperative paperwork. Surgical intervention has been recommended and she is scheduled to undergo a left shoulder arthroscopic labral repair, extensive debridement, biceps tenodesis, possible rotator cuff repair and possible open reduction internal fixation of the glenoid on 5/16/2024.    History 5/6/2024:  Rupal returns to clinic today to review MRI and CT results of the left shoulder.    History 4/12/2024:  59 y.o. Female with a history of left shoulder pain who had an injury on 2/27/24 that occurred at home. She states that her leg gave out and when she was falling she caught herself with the left side. She did not go to the ED after her fall because the wait time was too long. She went to the ER on 3/16 24 when her symptoms had not improved. She was told she had a minimally displaced fracture of the left glenoid as well as an avulsion fracture of the left olecranon. She was referred to Ortho and saw Dr. Son Pozo on 3/20/24. She was placed in a sling for support and given exercises to do at home on her own. She is no longer having any pain in her elbow but continues to struggle with left shoulder pain. She has a decreased ROM and pain with certain movements. She describes the pain as achy and sore but sometimes has sharp pains at night.    - mechanical symptoms, - instability    Is affecting ADLs.  Pain is 6/10 at it's worst.    PAST MEDICAL HISTORY    Past Medical History:   Diagnosis Date    Diabetes mellitus, type 2     Hyperlipidemia     Hypertension     Obesity     KINA (obstructive sleep apnea)        PAST SURGICAL HISTORY     Past Surgical History:   Procedure Laterality Date    ADENOIDECTOMY      BREAST BIOPSY Right     @ age 19    CHONDROPLASTY OF KNEE Right 2/12/2021    Procedure: CHONDROPLASTY, KNEE;  Surgeon: GALE Gallego MD;  Location: [facility];  Service: Orthopedics;  Laterality: Right;    COLONOSCOPY  N/A 10/16/2023    Procedure: COLONOSCOPY;  Surgeon: Sultana Lobo MD;  Location: [facility] ENDOSCOPY;  Service: Endoscopy;  Laterality: N/A;  Referred by: CECIL Mcmillan Meds: Ozempic - pt inst to hold per protocol  Prep: Suprep  Route instructions sent: portal  SW  10/9- precall complete.  DBM    HYSTERECTOMY      2009    KNEE ARTHROSCOPY W/ MENISCECTOMY Right 2/12/2021    Procedure: ARTHROSCOPY, KNEE, WITH MEDIAL MENISCECTOMY LATERAL RELEASE;  Surgeon: GALE Gallego MD;  Location: [facility] OR;  Service: Orthopedics;  Laterality: Right;  pericapsular injection: 30cc ropivacaine/epi/clonidine/ketorolac injection     KNEE SURGERY      SYNOVECTOMY OF KNEE Right 2/12/2021    Procedure: SYNOVECTOMY, KNEE;  Surgeon: GALE Gallego MD;  Location: [facility] OR;  Service: Orthopedics;  Laterality: Right;    THYROIDECTOMY, PARTIAL      TONSILLECTOMY         FAMILY HISTORY    Family History   Problem Relation Name Age of Onset    Diabetes Mother      Heart disease Mother      Hypertension Mother      Hyperlipidemia Mother      Stroke Mother      Cataracts Mother      Cancer Paternal Aunt          Lung    Diabetes Paternal Grandmother      Breast cancer Paternal Aunt      Macular degeneration Paternal Aunt      Breast cancer Cousin      Colon cancer Neg Hx      Ovarian cancer Neg Hx         SOCIAL HISTORY    Social History     Socioeconomic History    Marital status:     Number of children: 2   Tobacco Use    Smoking status: Never    Smokeless tobacco: Never   Substance and Sexual Activity    Alcohol use: Yes     Comment: rare    Drug use: No    Sexual activity: Yes     Partners: Male     Social Determinants of Health     Financial Resource Strain: Low Risk  (2/15/2024)    Overall Financial Resource Strain (CARDIA)     Difficulty of Paying Living Expenses: Not hard at all   Food Insecurity: No Food Insecurity (2/15/2024)    Hunger Vital Sign     Worried About Running Out of Food in the Last Year: Never true     Ran Out  of Food in the Last Year: Never true   Transportation Needs: No Transportation Needs (2/15/2024)    PRAPARE - Transportation     Lack of Transportation (Medical): No     Lack of Transportation (Non-Medical): No   Physical Activity: Unknown (2/15/2024)    Exercise Vital Sign     Days of Exercise per Week: 3 days   Stress: No Stress Concern Present (2/15/2024)    [employer] - Occupational Stress Questionnaire     Feeling of Stress : Not at all   Housing Stability: Low Risk  (2/15/2024)    Housing Stability Vital Sign     Unable to Pay for Housing in the Last Year: No     Number of Places Lived in the Last Year: 1     Unstable Housing in the Last Year: No       MEDICATIONS      Current Outpatient Medications:     albuterol (PROAIR HFA) 90 mcg/actuation inhaler, Inhale 2 puffs into the lungs every 6 (six) hours as needed for Wheezing or Shortness of Breath. Rescue, Disp: 18 g, Rfl: 2    atorvastatin (LIPITOR) 40 MG tablet, TAKE 1 TABLET(40 MG) BY MOUTH EVERY DAY (Patient not taking: Reported on 5/9/2024), Disp: 90 tablet, Rfl: 1    blood sugar diagnostic Strp, Check blood sugars TID, Disp: 200 strip, Rfl: 11    blood-glucose meter (FREESTYLE SYSTEM KIT) kit, Use as instructed, Disp: 1 each, Rfl: 1    ibuprofen (ADVIL,MOTRIN) 800 MG tablet, Take 1 tablet (800 mg total) by mouth 3 (three) times daily. Take with meals., Disp: 30 tablet, Rfl: 0    ibuprofen (ADVIL,MOTRIN) 800 MG tablet, Take 1 tablet (800 mg total) by mouth every 6 (six) hours as needed for Pain., Disp: 20 tablet, Rfl: 0    lancets (ONETOUCH ULTRASOFT LANCETS) Misc, Check blood sugars TID, Disp: 200 each, Rfl: 11    OZEMPIC 1 mg/dose (4 mg/3 mL), INJECT 1 MG UNDER THE SKIN ONCE A WEEK, Disp: 9 mL, Rfl: 1    valsartan-hydrochlorothiazide (DIOVAN-HCT) 160-12.5 mg per tablet, Take 1 tablet by mouth once daily., Disp: 90 tablet, Rfl: 1    ALLERGIES     Review of patient's allergies indicates:  No Known Allergies      REVIEW OF  SYSTEMS   Constitution: Negative. Negative for chills, fever and night sweats.   HENT: Negative for congestion and headaches.    Eyes: Negative for blurred vision, left vision loss and right vision loss.   Cardiovascular: Negative for chest pain and syncope.   Respiratory: Negative for cough and shortness of breath.    Endocrine: Negative for polydipsia, polyphagia and polyuria.   Hematologic/Lymphatic: Negative for bleeding problem. Does not bruise/bleed easily.   Skin: Negative for dry skin, itching and rash.   Musculoskeletal: Negative for falls. Positive for left shoulder pain.  Gastrointestinal: Negative for abdominal pain and bowel incontinence.   Genitourinary: Negative for bladder incontinence and nocturia.   Neurological: Negative for disturbances in coordination, loss of balance and seizures.   Psychiatric/Behavioral: Negative for depression. The patient does not have insomnia.    Allergic/Immunologic: Negative for hives and persistent infections.     PHYSICAL EXAMINATION    Vitals: There were no vitals taken for this visit.    General: The patient appears active and healthy with no apparent physical problems.  No disturbance of mood or affect is demonstrated. Alert and Oriented.    HEENT: Eyes normal, pupils equally round, nose normal.    Resp: Equal and symmetrical chest rises. No wheezing    CV: Regular rate    Neck: Supple; nonpainful range of motion.    Extremities: no cyanosis, clubbing, edema, or diffuse swelling.  Palpable pulses, good capillary refill of the digits.  No coolness, discoloration, edema or obvious varicosities.    Skin: no lesions noted.    Lymphatic: no detected adenopathy in the upper or lower extremities.    Neurologic: normal mental status, normal reflexes, normal gait and balance.  Patient is alert and oriented to person, place and time.  No flaccidity or spasticity is noted.  No motor or sensory deficits are noted.  Light touch is intact    Orthopaedic: SHOULDER EXAM -  LEFT    Inspection:   Normal skin color and appearance with no scars.  No muscle atrophy noted.  No scapular winging.      Palpation: No tenderness of the acromioclavicular joint, lateral edge of the acromion, biceps tendon, trapezius muscle or scapulothoracic bursa.      ROM:      PROM:     FE - 90°    Abd/ER -  60°  Abd/IR -  15°   Add/ER -  30°     AROM:    FE - 90°    Abd/ER -  6 0°  Abd/IR -  15°   Add/ER -  30°         Tests:     - Raza, - Neer's, - Cross Arm Adduction, - Florence, - Yerguson, - Speed. - Belly Press,  - Jobes, - Lift Off    Stability: - sulcus, - apprehension, - relocation, - load and shift, - DLS      Motor:  Rotator cuff strength is 4/5 supraspinatus, 4/5 infraspinatus, 5/5 subscapularis. Biceps, triceps and deltoid strength is 5/5.      Neuro     Distally there are no paresthesias, and sensation is intact to light touch in the median, ulnar, and radial distributions.  Reflexes are 2/2 biceps, triceps and brachioradialis.       IMAGING    CT Shoulder Without Contrast Left  Narrative: EXAMINATION:  CT SHOULDER WITHOUT CONTRAST LEFT    CLINICAL HISTORY:  Fracture, shoulder;  Displaced fracture of glenoid cavity of scapula, left shoulder, initial encounter for closed fracture    TECHNIQUE:  1.25 mm axial images were obtained through the left shoulder without the use of contrast.  Coronal and sagittal reformats were performed.    COMPARISON:  Radiographs 04/12/2024, 04/03/2024, MRI 05/02/2024.    FINDINGS:  Bones: Fracture of the anterior-inferior glenoid with 2 mm of depression and a 3 mm articular surface gap.  Subcortical lucency at the superior facet of the greater tuberosity, likely degenerative.  No suspicious lytic or blastic lesions.    Joints: Mild glenohumeral cartilage space narrowing with associated degenerative osteophyte formation about the glenoid rim and humeral head/neck junction.  Glenohumeral joint effusion.  Mild AC joint arthrosis.    Muscles: Normal bulk and  attenuation.    Miscellaneous: Degenerative changes of the spine.  Postsurgical change of left thyroidectomy.  Mild coronary artery atherosclerosis.  Left lower lobe subsegmental band like atelectasis.  No axillary lymphadenopathy.  Subcutaneous soft tissues appear within normal limits.  Impression: 1. Fracture of the anterior-inferior glenoid with 2 mm of depression.    Electronically signed by: González Mascorro MD  Date:    05/02/2024  Time:    20:23  MRI Shoulder Without Contrast Left  Narrative: EXAMINATION:  MRI SHOULDER WITHOUT CONTRAST LEFT    CLINICAL HISTORY:  Shoulder trauma, instability or dislocation suspected, xray done;Shoulder trauma, rotator cuff tear suspected, xray done;    TECHNIQUE:  Routine MRI evaluation of the left shoulder performed without contrast.    COMPARISON:  Radiographs 04/12/2024, 04/03/2024; CT 05/02/2024.    FINDINGS:  Examination degraded by patient motion artifact.    ROTATOR CUFF: Mild supraspinatus tendinosis with articular surface fraying.  Mild infraspinatus tendinosis with a small (0.3 cm AP), partial-thickness (less than 50%) interstitial/concealed footprint tear.  Subscapularis and teres minor tendons are intact.  Muscle bulk is preserved.    LABRUM: Near circumferential abnormal morphology and signal intensity of the glenoid labrum.  Hypertrophied middle glenohumeral ligament with a hypoplastic superior labrum, possibly a Moises complex.  No paralabral cyst.    BICEPS: Mild thickening and increased signal intensity of the intra-articular biceps tendon.    BONES: Recent fracture of the anterior-inferior aspect of the glenoid with 2 mm of depression.  Degenerative bone productive changes about the medial humeral head/neck junction and glenoid rim.  No avascular necrosis.  No marrow infiltrative process.    AC JOINT: Mild AC joint arthrosis.  Flat morphology of the lateral acromion without undersurface spurring.    CARTILAGE: Partial-thickness chondral fissuring throughout  the humeral head and glenoid.  Small focal area of subchondral cystic change at the lateral aspect of the superior humeral head.    MISCELLANEOUS: Joint effusion with synovitis.  Mild signal hyperintensity within the subacromial subdeltoid bursa.  No glenohumeral ligament avulsion.  No axillary lymphadenopathy.  Impression: 1. Recent fracture of the anterior-inferior glenoid with 2 mm of depression.  2. Near circumferential labral fraying/tearing.  No paralabral cyst.  No glenohumeral ligament avulsion.  Possible Moises complex.  3. Infraspinatus tendinosis with a small, partial-thickness interstitial/concealed footprint tear.  4. Supraspinatus tendinosis with mild articular surface fraying.  5. Biceps tendinosis.  6. Mild glenohumeral osteoarthritis.  7. Joint effusion with synovitis.    Electronically signed by: González Mascorro MD  Date:    05/02/2024  Time:    19:55        IMPRESSION       ICD-10-CM ICD-9-CM   1. Traumatic complete tear of left rotator cuff, initial encounter  S46.012A 840.4   2. Labral tear of shoulder, left, initial encounter  S43.432A 840.8   3. Closed displaced fracture of glenoid cavity of left scapula, initial encounter  S42.142A 811.03         MEDICATIONS PRESCRIBED      Aspirin 81 mg  Norco 7.5/325    RECOMMENDATIONS     Left shoulder arthroscopic labral repair, possible rotator cuff repair, possible biceps tenodesis and possible open reduction internal fixation of the glenoid  The patient elected to proceed with operative intervention after all risks, benefits, and alternatives were discussed with the patient. The risks of surgery include bleeding, scarring, injuries to nerves, arteries and veins, need for additional surgeries, blood clots, infections, and the risk of anesthesia. I personally obtained informed consent from the patient and documented full history and physical, which has been placed on the chart.   Informed consent was obtained   Physical Therapy has been ordered - [facility]  [place]    All questions were answered, pt will contact us for questions or concerns in the interim.

## 2024-05-15 NOTE — ASSESSMENT & PLAN NOTE
Consistent of urgency and dripping.    History of cystocele daily.  Not repaired.  Referral to Urogynecology

## 2024-05-15 NOTE — ASSESSMENT & PLAN NOTE
Health care maintenance and core gaps reviewed and assessed with patient.  Vaccinations:        Tetanus - ?       Shingles - 2022       Influenza - 2022       Pneumonia - ?       COVID - completed X 3  We will obtain records from the [facility], apparently patient was vaccinated there  Colon cancer screening:   Colonoscopy: 2021?  Lifestyle recommendations given.  Physical activity recommended.    Legend: Ordered (O), Declined (D), Current (C)

## 2024-05-15 NOTE — ASSESSMENT & PLAN NOTE
Lab Results   Component Value Date    TSH 0.296 (L) 05/15/2024     On levothyroxine for more than 30 years.    Currently asymptomatic.    I will decrease the dose of Synthroid from 75 to 50 mcg daily.  Rx printed and given to the patient

## 2024-05-15 NOTE — ASSESSMENT & PLAN NOTE
Lab Results   Component Value Date    WBC 17.89 (H) 05/15/2024    HGB 14.4 05/15/2024    HCT 42.3 05/15/2024    MCV 92 05/15/2024     05/15/2024     Incidental finding  Repeat CBC shows the same elevated WBCs  Patient is asymptomatic, no source of infection found.  Normal UA and chest x-ray.    Flow cytometry ordered  Pathology interpretation ordered.    Referral to Hematology

## 2024-05-15 NOTE — ASSESSMENT & PLAN NOTE
Results for orders placed or performed during the hospital encounter of 05/15/24 (from the past 2160 hour(s))   MRI Ankle Without Contrast Right    Impression    1. Complete avulsion of the insertional Achilles tendon with 4.5 cm of retraction.  Tendinosis and partial-thickness interstitial tearing of the retracted tendon stump with a 1.5 cm avulsed enthesophyte.  2. Peroneus longus and peroneus brevis tendinosis and partial-thickness tearing.  3. 0.3 x 0.4 cm osteochondral lesion of the medial talar dome.  4. Findings suggestive of mild chronic plantar fascitis involving the central cord.    Electronically signed by resident: Beena Ochoa  Date:    05/15/2024  Time:    11:36    Electronically signed by: González Mascorro MD  Date:    05/15/2024  Time:    15:09     Patient is being managed by sports Medicine.  Referral to Podiatry.  Possible surgery.

## 2024-05-15 NOTE — ASSESSMENT & PLAN NOTE
Bilateral lower extremity telangiectasias, abundant, about his who is veins.    No leg swelling or pain.    Recommended compression stockings for now

## 2024-05-16 ENCOUNTER — HOSPITAL ENCOUNTER (OUTPATIENT)
Dept: RADIOLOGY | Facility: CLINIC | Age: 69
Discharge: HOME | End: 2024-05-16
Attending: STUDENT IN AN ORGANIZED HEALTH CARE EDUCATION/TRAINING PROGRAM

## 2024-05-16 ENCOUNTER — HOSPITAL ENCOUNTER (OUTPATIENT)
Dept: RADIOLOGY | Facility: HOSPITAL | Age: 69
Discharge: HOME | End: 2024-05-16
Attending: STUDENT IN AN ORGANIZED HEALTH CARE EDUCATION/TRAINING PROGRAM

## 2024-05-16 ENCOUNTER — TELEPHONE (OUTPATIENT)
Dept: ORTHOPEDICS | Facility: CLINIC | Age: 69
End: 2024-05-16

## 2024-05-16 VITALS — BODY MASS INDEX: 29.59 KG/M2 | WEIGHT: 167 LBS | HEIGHT: 63 IN

## 2024-05-16 DIAGNOSIS — M51.36 DDD (DEGENERATIVE DISC DISEASE), LUMBAR: ICD-10-CM

## 2024-05-16 DIAGNOSIS — Z00.00 HEALTHCARE MAINTENANCE: ICD-10-CM

## 2024-05-16 PROCEDURE — 77067 SCR MAMMO BI INCL CAD: CPT | Mod: TC

## 2024-05-16 PROCEDURE — 77067 SCR MAMMO BI INCL CAD: CPT | Mod: 26,,, | Performed by: RADIOLOGY

## 2024-05-16 PROCEDURE — 77080 DXA BONE DENSITY AXIAL: CPT | Mod: 26,,, | Performed by: INTERNAL MEDICINE

## 2024-05-16 PROCEDURE — 77063 BREAST TOMOSYNTHESIS BI: CPT | Mod: 26,,, | Performed by: RADIOLOGY

## 2024-05-16 PROCEDURE — 77080 DXA BONE DENSITY AXIAL: CPT | Mod: TC

## 2024-05-16 NOTE — TELEPHONE ENCOUNTER
LVM with patient to set up appointment with Dr. Johnson.      Tmo Swain, MS, OT  Orthopedic Clinical / OR Assistant to Dr. Coy Johnson

## 2024-05-17 ENCOUNTER — OFFICE VISIT (OUTPATIENT)
Dept: INTERNAL MEDICINE | Facility: CLINIC | Age: 69
End: 2024-05-17

## 2024-05-17 DIAGNOSIS — Z71.89 ENCOUNTER FOR MEDICATION REVIEW AND COUNSELING: Primary | ICD-10-CM

## 2024-05-17 DIAGNOSIS — D72.823 LEUKEMOID REACTION: ICD-10-CM

## 2024-05-17 DIAGNOSIS — E03.9 ACQUIRED HYPOTHYROIDISM: ICD-10-CM

## 2024-05-17 PROCEDURE — 99213 OFFICE O/P EST LOW 20 MIN: CPT | Mod: S$PBB,,, | Performed by: STUDENT IN AN ORGANIZED HEALTH CARE EDUCATION/TRAINING PROGRAM

## 2024-05-17 PROCEDURE — 99999 PR PBB SHADOW E&M-EST. PATIENT-LVL II: CPT | Mod: PBBFAC,,, | Performed by: STUDENT IN AN ORGANIZED HEALTH CARE EDUCATION/TRAINING PROGRAM

## 2024-05-17 PROCEDURE — 99212 OFFICE O/P EST SF 10 MIN: CPT | Mod: PBBFAC | Performed by: STUDENT IN AN ORGANIZED HEALTH CARE EDUCATION/TRAINING PROGRAM

## 2024-05-17 NOTE — PROGRESS NOTES
Subjective:       Patient ID: Bere Rivera is a 69 y.o. female.    Chief Complaint: No chief complaint on file.    HPI    Bere Rivera is a 69 y.o. female , Mohawk speaking, with a history of:  Hypothyroidism  R Achilles tendon injury  OA left, patellofemoral Sx  R ankle pain  Chronic pain of left knee  Urinary incontinence  H/o cystocele        [International Patient]  Originally from [place]  Lives in: [place] [id number]          Patient comes to the clinic to for a review of test results and specialists recommendations during this visit to [facility].    All test results reviewed and explained to the patient.    Specialists recommendations discussed with the patient.    All questions answered.    Pending evaluation by Urogynecology.      Repeat CBC was still high by pathology interpretation did not seem concerning for malignancy.      Ortho is not going to operate on her ankle at this time    No other concerns.    Most recent laboratories reviewed:    Recent Labs   Lab 05/15/24  0801 05/15/24  1412   WBC 17.41 H 17.89 H   Hemoglobin 14.2 14.4   Hematocrit 41.9 42.3   MCV 91 92   Platelets 406 423       Recent Labs   Lab 05/15/24  0801   Glucose 83   Sodium 141   Potassium 3.6   BUN 12   Creatinine 0.8   Total Bilirubin 0.5   AST 15   ALT 15       Recent Labs   Lab 05/15/24  0801   Hemoglobin A1C 5.5       Recent Labs   Lab 05/15/24  0801   Cholesterol 202 H   Triglycerides 96   HDL 78 H   LDL Cholesterol 104.8   Non-HDL Cholesterol 124       Recent Labs   Lab 05/15/24  0801   TSH 0.296 L       Recent Labs   Lab 05/15/24  0801   Hepatitis C Ab Non-reactive         Most recent images and procedures:    MRI Ankle Without Contrast Right  Narrative: EXAMINATION:  MRI ANKLE WITHOUT CONTRAST RIGHT    CLINICAL HISTORY:  Ankle trauma, dislocation/ligament injury suspected (Age >= 5y);  Strain of right Achilles tendon, initial encounter    TECHNIQUE:  Multiplanar multisequence MRI  examination of the right ankle.    COMPARISON:  Renal radiograph 05/13/2024.    FINDINGS:  LIGAMENTS: Anterior talofibular, posterior talofibular, superficial deltoid, deep deltoid, superomedial spring, anterior-inferior tibiofibular, posterior-inferior tibiofibular, dorsal talonavicular, bifurcate and dorsal calcaneocuboid ligaments appear grossly intact.    TENDONS: Complete avulsion of the insertional fibers of the Achilles tendon with 4.5 cm of retraction.  Retracted tendon stump demonstrates tendinosis with partial-thickness interstitial tearing and a 1.5 cm avulsed enthesophyte.  Peroneus brevis tendinosis with partial-thickness tearing most pronounced just distal to the retromalleolar groove.  Peroneus longus tendinosis with partial-thickness tearing extending from the retromalleolar groove distally up to the level of the cuboid sulcus.  Trace posterior tibial tendon sheath fluid.  Flexor digitorum longus, flexor hallucis longus and extensor tendons are intact.    BONES: No acute fractures.  No avascular necrosis.  No marrow infiltrative process.  Mild subcortical marrow edema at the dorsal aspect of the talar neck, likely degenerative.  Slightly prominent peroneal tubercle.    JOINTS/CARTILAGE: 0.3 x 0.4 cm focal area of chondral fissuring with subchondral cystic change at the medial talar dome.  Subtalar, midtarsal, naviculocuneiform and tarsometatarsal chondral surfaces are grossly preserved.  No significant joint effusion.  No tarsal coalition.  No subluxation or dislocation.    MUSCLES: Normal bulk and signal intensity.  No accessory muscles.    MISCELLANEOUS: Thickening and increased signal intensity of the central cord of the plantar aponeurosis with a small calcaneal enthesophyte.  Sinus tarsi demonstrates preserved signal intensity.  Tarsal tunnel appears normal.  Mild nonspecific subcutaneous edema.  Impression: 1. Complete avulsion of the insertional Achilles tendon with 4.5 cm of retraction.   Tendinosis and partial-thickness interstitial tearing of the retracted tendon stump with a 1.5 cm avulsed enthesophyte.  2. Peroneus longus and peroneus brevis tendinosis and partial-thickness tearing.  3. 0.3 x 0.4 cm osteochondral lesion of the medial talar dome.  4. Findings suggestive of mild chronic plantar fascitis involving the central cord.    Electronically signed by resident: Beena Ochoa  Date:    05/15/2024  Time:    11:36    Electronically signed by: González Mascorro MD  Date:    05/15/2024  Time:    15:09  X-Ray Chest PA And Lateral  Narrative: EXAMINATION:  XR CHEST PA AND LATERAL    CLINICAL HISTORY:  Hypothyroidism, unspecified    TECHNIQUE:  PA and lateral views of the chest were performed.    COMPARISON:  09/09/2011    FINDINGS:  Cardiac size is normal.  Lungs are clear.  No infiltrate is seen.  Impression: Normal chest    Electronically signed by: Sumanth Clarke MD  Date:    05/15/2024  Time:    12:30      Current medications:    Current Outpatient Medications:     esomeprazole (NEXIUM) 40 MG capsule, Take 40 mg by mouth before breakfast. AS NEEDED - Every 3 months, Disp: , Rfl:     SYNTHROID 50 mcg tablet, Take 1 tablet (50 mcg total) by mouth before breakfast., Disp: 90 tablet, Rfl: 3    UNABLE TO FIND, medication name: XUMER -- PAIN & ANTI- INFLAMMATORY (Patient not taking: Reported on 5/15/2024), Disp: , Rfl:       Healthcare Maintenance:  Colon cancer screening:         Vaccinations:        Tetanus - ?       Shingles - 2022       Influenza - 2022       Pneumonia - ?       COVID - completed X 3     Depression screening: PHQ2 score = 0     Annual visit approx. Month: MAY    ROS  11-point review of systems done. Negative except for detailed in the HPI.        Objective:      There were no vitals taken for this visit.     Physical Exam   General appearance: Good general aspect, NAD, conversant   Eyes and HEENT: Normal sclerae  Respiratory: No work of breathing  Cardiovascular: RRR  Abdomen  and : Nondistended  Extremities: no edemas  Nervous System: Alert and oriented x 3  Psych: Appropriate affect, alert and oriented to person, place and time            Assessment:         1. Encounter for medication review and counseling  Assessment & Plan:  Test results, images and recommendations by other specialties reviewed and discussed with the patient. Questions answered.        2. Leukemoid reaction  Assessment & Plan:  Lab Results   Component Value Date    WBC 17.89 (H) 05/15/2024    HGB 14.4 05/15/2024    HCT 42.3 05/15/2024    MCV 92 05/15/2024     05/15/2024       Recommended to repeat CBC in 1 week in [place]    Orders:  -     CBC W/ AUTO DIFFERENTIAL; Future; Expected date: 05/27/2024    3. Acquired hypothyroidism  Assessment & Plan:  Continue levothyroxine at 50 mcg daily.  Rx already even to the patient         Plan:         Obtain medical records from [facility].    Repeat CBC in 1 week            Patient Instructions   Repetir cuadro hematico en 10 gomes en [place]    Planear para valoracion por uro ginecologia       Problem list updated  Medications reconciled  Education provided  Lifestyle recommendations given  AVS generated, explained, and sent to the patient.  RTC in : 1 year for annual wellness visit  Labs: Not ordered yet              TOÑA TURNER MD, MPH  Internal Medicine  [facility]  [facility]

## 2024-05-17 NOTE — ASSESSMENT & PLAN NOTE
Lab Results   Component Value Date    WBC 17.89 (H) 05/15/2024    HGB 14.4 05/15/2024    HCT 42.3 05/15/2024    MCV 92 05/15/2024     05/15/2024       Recommended to repeat CBC in 1 week in [place]

## 2024-05-17 NOTE — ASSESSMENT & PLAN NOTE
Test results, images and recommendations by other specialties reviewed and discussed with the patient. Questions answered.

## 2024-05-20 ENCOUNTER — OFFICE VISIT (OUTPATIENT)
Dept: ORTHOPEDICS | Facility: CLINIC | Age: 69
End: 2024-05-20

## 2024-05-20 VITALS — WEIGHT: 167.13 LBS | BODY MASS INDEX: 29.61 KG/M2 | HEIGHT: 63 IN

## 2024-05-20 DIAGNOSIS — S86.011A ACHILLES TENDON RUPTURE, RIGHT, INITIAL ENCOUNTER: ICD-10-CM

## 2024-05-20 PROCEDURE — 99213 OFFICE O/P EST LOW 20 MIN: CPT | Mod: SF,S$PBB,, | Performed by: SURGERY

## 2024-05-20 PROCEDURE — 99999 PR PBB SHADOW E&M-EST. PATIENT-LVL III: CPT | Mod: PBBFAC,,, | Performed by: SURGERY

## 2024-05-20 PROCEDURE — 99213 OFFICE O/P EST LOW 20 MIN: CPT | Mod: PBBFAC,PN | Performed by: SURGERY

## 2024-05-20 RX ORDER — TRIPROLIDINE/PSEUDOEPHEDRINE 2.5MG-60MG
TABLET ORAL EVERY 6 HOURS PRN
COMMUNITY

## 2024-05-20 NOTE — PROGRESS NOTES
"SUBJECTIVE:    Ms. Saturnino Rivera is here today for a follow up visit.  She has a chronic right Achilles tendon rupture, 6-month-old, as well as left foot hammertoes which are flexible, 2 through 5.  She presents as a referral from Dr. Kan, who recommended nonoperative management of her Achilles tendon versus V-Y plasty and possible FHL transfer, ostectomy, retrocalcaneal bursectomy and any other indicated procedures.  The patient and her  present for a 2nd opinion today along with our .  The patient denies any pain, however she finds the hammertoes annoying.        OBJECTIVE:      Vitals:    05/20/24 1008   Weight: 75.8 kg (167 lb 1.7 oz)   Height: 5' 3" (1.6 m)   PainSc: 0-No pain       Lower Extremity Exam  Right lower extremity with proximal migration of the Achilles and palpable mass consistent with calcaneal enthesophyte.  Excellent strength from the Achilles with 4/5 plantar flexion, a Grady test that elicits of plantar flexion response, minimally decreased resting tension and dorsiflexion versus the contralateral side.  She is nontender to palpation posteriorly about the calcaneus and Achilles tendon.  Examining the forefoot she has a neutral alignment and flexible hammertoes at 2 through 5.  She has a scar consistent with a former bunion correction.  The 1st toe appears plantar grade.  She is negative for any flatfoot deformity.  She is neurovascularly intact about the foot and ankle.     DIAGNOSTIC STUDIES:  Previous imaging reviewed  MRI:  1. Complete avulsion of the insertional Achilles tendon with 4.5 cm of retraction.  Tendinosis and partial-thickness interstitial tearing of the retracted tendon stump with a 1.5 cm avulsed enthesophyte.  2. Peroneus longus and peroneus brevis tendinosis and partial-thickness tearing.  3. 0.3 x 0.4 cm osteochondral lesion of the medial talar dome.  4. Findings suggestive of mild chronic plantar fascitis involving the central cord.    X-ray right " ankle:  Ossific density in the posterior ankle tissues.    ASSESSMENT:   1. Chronic insertional Achilles tendon rupture  2. Hammertoes, flexible, right foot      PLAN:  Bere was seen today for pain.    Diagnoses and all orders for this visit:    Achilles tendon rupture, right, initial encounter  -     Ambulatory referral/consult to Orthopedics        Discussed operative and nonoperative options for her hammertoes as well as her Achilles.  I agree with Dr. Kan that the Achilles can be managed nonoperatively.  I recommended toe splints for the hammertoes which were applied today.  She can follow up with me at her convenience.  I gave him my card.  They are going to let us know if they would like to proceed with flexor tenotomies in the future.  I discussed that versus rigid fixation of the hammertoes, the patient would prefer the flexor tenotomies as she does not wish for the prolonged nonweightbearing timeframe or pins necessary for the other procedures.    Coy Johnson MD  [facility]  Orthopedic Surgery      This note was done with voice recognition software. Please excuse any errors missed in proof reading.

## 2024-05-27 ENCOUNTER — DOCUMENTATION ONLY (OUTPATIENT)
Dept: INTERNAL MEDICINE | Facility: CLINIC | Age: 69
End: 2024-05-27

## 2024-05-27 DIAGNOSIS — Z79.899 ENCOUNTER FOR MEDICATION REVIEW: Primary | ICD-10-CM

## 2024-05-27 NOTE — PROGRESS NOTES
REVIEW OF MEDICAL RECORDS    Medical records from the [facility] / [facility] were received on 5/22/24    Review of the records listed next:    Patient underwent executive international checkup at the [facility] for multiple chief complaints in March of 2022.    CONSULTS    GENERAL INTERNAL MEDICINE:  Patient was seen for  - bilateral knee pain, left leg pain, lower back pain with radiculopathy:  PT, MRI of the left extremities ordered.    - urinary incontinence, urinary frequency, despite union:  Referral to Urogynecology, uroflow  - epigastric pain, reflux, hiatal hernia:  EGD colonoscopy, PPI, referral to Gastroenterology.    ENDOCRINOLOGY  Hypothyroidism:  Euthyroid on exam, continue levothyroxine at 75 mcg daily    GI consult  EDG, Colonoscopy ordered      - Physical therapy / sports Medicine:  Patient received treatment with physical therapy for the left knee.  She also had intra-articular injection and physical therapy.          - urology evaluation 03/30/2022:   Uroflow: Low voided volume with postvoid residual      Review of images and procedures:    Chest x-ray 03/30/2022: Tortuous aorta, mild hypertrophy and degenerative changes of the thoracic spine.  Old healed rib fracture    CT of the lung 03/31/2022: Negative    EGD March of 2022:  Small hiatal hernia     Colonoscopy: March of 2022:  7 mm polyp    Electromyography 03/30/2022:  Mild right L5 radiculopathy without signs of denervation    X-ray hip to ankle 03/29/2029:  Degenerative arthritis of the knees (bilateral), genu varus deformity, degenerative arthritis of the left hip, mild.  Calcific tendonitis of the left greater trochanter.    Bone density scan: 03/28/2022: Osteopenia    Mammogram 03/28/2022:  Benign, BI-RADS 2  MRI of the left knee with contrast:  displaced tear of the left medial meniscal posterior horn.    Complete diffuse chondromalacia through the medial compartment with moderate lateral and patellofemoral compartment  chondromalacia.    Mild superficial and deep left medial collateral ligament bursitis.      EKG 03/30/2022 NSR    Review of laboratories:  03/28/2022:  SARs COVID 19 PCR: Negative   UA: Negative   Urine osmolality:  Within normal range   Fasting glucose: 100  For everything within normal ranges   Anti CCP: Negative   TSH 0.9   Vitamin-D:  Low 18   CBC: Within normal limits   Iron studies: Within normal limits   Lipid panel: ,   Hepatitis-C screen negative  Vitamin B12: Within normal limits   Electrolytes:  Negative.    Pro BNP: 78  Hemoglobin A1c 5.8  OSCAR is negative  IgA:  Borderline high 374  CK within normal limits   CRP: 6.4  Rheumatoid factor: Negative   ESR negative   Hepatitis-B surface antigen negative  Anti tissue transglutaminase antibody IgG a: Negative  Methylmalonic acid: Negative

## 2024-08-19 PROBLEM — Z00.00 HEALTHCARE MAINTENANCE: Status: RESOLVED | Noted: 2024-05-15 | Resolved: 2024-08-19

## 2024-08-19 PROBLEM — Z00.00 ROUTINE GENERAL MEDICAL EXAMINATION AT HEALTH CARE FACILITY: Status: RESOLVED | Noted: 2024-05-15 | Resolved: 2024-08-19

## 2024-10-23 ENCOUNTER — TELEPHONE (OUTPATIENT)
Dept: UROLOGY | Facility: CLINIC | Age: 69
End: 2024-10-23

## 2024-10-23 NOTE — TELEPHONE ENCOUNTER
----- Message from Nurse Aurelia sent at 10/23/2024  2:08 PM CDT -----  Regarding: needs appt before Nov. 1s she will be leaving the country  Please call Louisa [phone] in the international dept to schedule this patient asap to see  before Nov.!st when she leaves the country

## 2024-10-23 NOTE — TELEPHONE ENCOUNTER
Called International Dept at [phone], spoke w/ Louisa. Pt leaving country on 11/1/2024. Was able to schedule her with Dr. Fitch for Thursday, 10/31/2024 at [facility] for 11:00am.

## 2024-10-31 ENCOUNTER — OFFICE VISIT (OUTPATIENT)
Dept: UROLOGY | Facility: CLINIC | Age: 69
End: 2024-10-31

## 2024-10-31 VITALS
SYSTOLIC BLOOD PRESSURE: 117 MMHG | HEART RATE: 68 BPM | BODY MASS INDEX: 31.13 KG/M2 | WEIGHT: 175.69 LBS | DIASTOLIC BLOOD PRESSURE: 79 MMHG

## 2024-10-31 DIAGNOSIS — N81.11 CYSTOCELE, MIDLINE: ICD-10-CM

## 2024-10-31 DIAGNOSIS — N32.81 OAB (OVERACTIVE BLADDER): Primary | ICD-10-CM

## 2024-10-31 DIAGNOSIS — N95.8 GENITOURINARY SYNDROME OF MENOPAUSE: ICD-10-CM

## 2024-10-31 PROCEDURE — 81002 URINALYSIS NONAUTO W/O SCOPE: CPT | Mod: PBBFAC | Performed by: UROLOGY

## 2024-10-31 PROCEDURE — 99999PBSHW PR PBB SHADOW TECHNICAL ONLY FILED TO HB: Mod: PBBFAC,,,

## 2024-10-31 PROCEDURE — 99999 PR PBB SHADOW E&M-EST. PATIENT-LVL III: CPT | Mod: PBBFAC,,, | Performed by: UROLOGY

## 2024-10-31 PROCEDURE — 51701 INSERT BLADDER CATHETER: CPT | Mod: PBBFAC | Performed by: UROLOGY

## 2024-10-31 PROCEDURE — 99213 OFFICE O/P EST LOW 20 MIN: CPT | Mod: PBBFAC | Performed by: UROLOGY

## 2024-10-31 RX ORDER — ESTRADIOL 0.1 MG/G
1 CREAM VAGINAL
Qty: 42.5 G | Refills: 3 | Status: SHIPPED | OUTPATIENT
Start: 2024-11-01 | End: 2025-11-01

## 2024-10-31 RX ORDER — OMEPRAZOLE 40 MG/1
40 CAPSULE, DELAYED RELEASE ORAL DAILY
COMMUNITY

## 2024-10-31 RX ORDER — DARIFENACIN 7.5 MG/1
7.5 TABLET, EXTENDED RELEASE ORAL DAILY
Qty: 30 TABLET | Refills: 11 | Status: SHIPPED | OUTPATIENT
Start: 2024-10-31

## 2024-11-13 ENCOUNTER — TELEPHONE (OUTPATIENT)
Dept: UROLOGY | Facility: HOSPITAL | Age: 69
End: 2024-11-13

## 2024-11-13 DIAGNOSIS — N32.81 OAB (OVERACTIVE BLADDER): Primary | ICD-10-CM

## 2024-11-13 RX ORDER — MIRABEGRON 50 MG/1
50 TABLET, FILM COATED, EXTENDED RELEASE ORAL DAILY
Qty: 30 TABLET | Refills: 11 | Status: SHIPPED | OUTPATIENT
Start: 2024-11-13 | End: 2025-11-13

## 2024-11-13 NOTE — TELEPHONE ENCOUNTER
----- Message from Hiral sent at 11/13/2024 12:45 PM CST -----  Regarding: Allergic reaction to Darifenacin 7.5 mg  Good afternoon Dr Fitch,    Mrs. Matamoros just sent me a message telling me that started taking Darifenacin 7.5mg and that it's causing her: Nausea, headache, constipation, and severe gastritis, that she has only taken the pills for 7 days, and she is having severe abdominal pain. He has been taking advil coated gel for the headache, pepto bismol, esomeprazole 40mg for the abdominal pain and that her stomach is swollen as if she is pregnant, she even has chills.   Please advice,  Thank you,  Hiral Mosley

## 2024-11-18 NOTE — TELEPHONE ENCOUNTER
"Called International Dept at [phone],  Id# [id number]. Tried assisting in calling pt. Number invalid. Tried calling by removing a "9", still invalid. Will try sending portal msg.   "